# Patient Record
Sex: FEMALE | Race: WHITE | Employment: OTHER | URBAN - METROPOLITAN AREA
[De-identification: names, ages, dates, MRNs, and addresses within clinical notes are randomized per-mention and may not be internally consistent; named-entity substitution may affect disease eponyms.]

---

## 2017-03-28 ENCOUNTER — HOSPITAL ENCOUNTER (OUTPATIENT)
Dept: PREADMISSION TESTING | Age: 77
Discharge: HOME | End: 2017-03-28
Payer: MEDICARE

## 2017-03-28 LAB
ALBUMIN SERPL BCP-MCNC: 3.8 G/DL (ref 3.4–5)
ALBUMIN/GLOB SERPL: 1 {RATIO} (ref 0.8–1.7)
ALP SERPL-CCNC: 104 U/L (ref 45–117)
ALT SERPL-CCNC: 18 U/L (ref 13–56)
ANION GAP BLD CALC-SCNC: 9 MMOL/L (ref 3–18)
AST SERPL W P-5'-P-CCNC: 18 U/L (ref 15–37)
BASOPHILS # BLD AUTO: 0.1 K/UL (ref 0–0.06)
BASOPHILS # BLD: 1 % (ref 0–2)
BILIRUB SERPL-MCNC: 0.6 MG/DL (ref 0.2–1)
BUN SERPL-MCNC: 25 MG/DL (ref 7–18)
BUN/CREAT SERPL: 24 (ref 12–20)
CALCIUM SERPL-MCNC: 9.4 MG/DL (ref 8.5–10.1)
CHLORIDE SERPL-SCNC: 102 MMOL/L (ref 100–108)
CO2 SERPL-SCNC: 28 MMOL/L (ref 21–32)
CREAT SERPL-MCNC: 1.06 MG/DL (ref 0.6–1.3)
DIFFERENTIAL METHOD BLD: ABNORMAL
EOSINOPHIL # BLD: 0.3 K/UL (ref 0–0.4)
EOSINOPHIL NFR BLD: 4 % (ref 0–5)
ERYTHROCYTE [DISTWIDTH] IN BLOOD BY AUTOMATED COUNT: 13.3 % (ref 11.6–14.5)
GLOBULIN SER CALC-MCNC: 3.8 G/DL (ref 2–4)
GLUCOSE SERPL-MCNC: 94 MG/DL (ref 74–99)
HCT VFR BLD AUTO: 41.3 % (ref 35–45)
HGB BLD-MCNC: 14.3 G/DL (ref 12–16)
LYMPHOCYTES # BLD AUTO: 30 % (ref 21–52)
LYMPHOCYTES # BLD: 2.4 K/UL (ref 0.9–3.6)
MCH RBC QN AUTO: 29.9 PG (ref 24–34)
MCHC RBC AUTO-ENTMCNC: 34.6 G/DL (ref 31–37)
MCV RBC AUTO: 86.4 FL (ref 74–97)
MONOCYTES # BLD: 0.4 K/UL (ref 0.05–1.2)
MONOCYTES NFR BLD AUTO: 5 % (ref 3–10)
NEUTS SEG # BLD: 4.9 K/UL (ref 1.8–8)
NEUTS SEG NFR BLD AUTO: 60 % (ref 40–73)
PLATELET # BLD AUTO: 173 K/UL (ref 135–420)
PMV BLD AUTO: 8.9 FL (ref 9.2–11.8)
POTASSIUM SERPL-SCNC: 4.3 MMOL/L (ref 3.5–5.5)
PROT SERPL-MCNC: 7.6 G/DL (ref 6.4–8.2)
RBC # BLD AUTO: 4.78 M/UL (ref 4.2–5.3)
SODIUM SERPL-SCNC: 139 MMOL/L (ref 136–145)
WBC # BLD AUTO: 8.1 K/UL (ref 4.6–13.2)

## 2017-03-28 PROCEDURE — 93005 ELECTROCARDIOGRAM TRACING: CPT

## 2017-03-28 PROCEDURE — 85025 COMPLETE CBC W/AUTO DIFF WBC: CPT | Performed by: OBSTETRICS & GYNECOLOGY

## 2017-03-28 PROCEDURE — 80053 COMPREHEN METABOLIC PANEL: CPT | Performed by: OBSTETRICS & GYNECOLOGY

## 2017-03-28 PROCEDURE — 36415 COLL VENOUS BLD VENIPUNCTURE: CPT | Performed by: OBSTETRICS & GYNECOLOGY

## 2017-03-29 LAB
ATRIAL RATE: 56 BPM
CALCULATED P AXIS, ECG09: 73 DEGREES
CALCULATED R AXIS, ECG10: 69 DEGREES
CALCULATED T AXIS, ECG11: 76 DEGREES
DIAGNOSIS, 93000: NORMAL
P-R INTERVAL, ECG05: 218 MS
Q-T INTERVAL, ECG07: 490 MS
QRS DURATION, ECG06: 86 MS
QTC CALCULATION (BEZET), ECG08: 472 MS
VENTRICULAR RATE, ECG03: 56 BPM

## 2017-03-31 ENCOUNTER — ANESTHESIA EVENT (OUTPATIENT)
Dept: SURGERY | Age: 77
End: 2017-03-31
Payer: MEDICARE

## 2017-03-31 NOTE — H&P
This 78year old female presents for postmenopausal bleeding:. History of Present Illness:  1.  postmenopausal bleeding: The symptoms began 3 months ago and generally lasts varies. The symptoms are reported as being moderate. The symptoms occur randomly. The location is vagina. The symptoms are described as odor. Aggravating factors include nothing. Relieving factors include nothing. She states the symptoms are acute and have worsened. patient presents for postmenopausal bleeding. states she had hysterectomy and BSO but has been having light bleeding and spotting for the last 2-3 months. states she is also having discharge with odor. Past Systemic History    Medical History (Detailed)  Disease Onset Date Comments   Aneurysm     Cardiovascular disease     Hypertension     left kidney removed     tonsillectomy     vascular surgeries (Abdominal)       Surgical History/Management (Detailed)  Management Date Comments   Appendectomy     Hysterectomy, total abdominal, BSO     Pelvic sling         PROBLEM LIST:  Problem Description Onset Date   Left lower quadrant pain 2014   Right lower quadrant pain 2014       Medication Reconciliation  Medications reconciled today. Allergies:  Ingredient Reaction Medication Name Comment   ERYTHROMYCIN BASE      CODEINE      AMLODIPINE BESYLATE  Norvasc    ACETAMINOPHEN  Percocet    NIFEDIPINE  Procardia    CIPROFLOXACIN      PENICILLINS      OXYCODONE HCL  Percocet    SULFA (SULFONAMIDE ANTIBIOTICS)          Family History  (Detailed)  Relationship Family Member Name  Age at Death Condition Onset Age Cause of Death   Father    Hypertension  N   Mother  Y  Myocardial infarction 44 Y   Mother    Hypertension  N   Paternal aunt    Cancer, gastric  N   Paternal uncle    Cancer, gastric  N     Social History:  (Detailed)  Preferred language is English.     Tobacco use status: Never smoked tobacco.  Smoking status: Never smoker. ALCOHOL  There is a history of alcohol use. CAFFEINE  The patient uses caffeine. Review of Systems  System Neg/Pos Details   Constitutional Negative Fever, night sweats, weight gain and weight loss. Respiratory Negative Dyspnea. Cardio Negative Chest pain and edema. GI Negative Abdominal pain and constipation.  Negative Dysuria, hematuria, urinary frequency and urinary incontinence. Endocrine Negative Cold intolerance and heat intolerance. Neuro Negative Headache. Psych Negative Anxiety and depression. Integumentary Negative Rash. MS Negative Back pain. Hema/Lymph Negative Easy bleeding and easy bruising. Reproductive Positive Vaginal discharge. Reproductive Negative Dysmenorrhea, dyspareunia, hot flashes and irregular menses. Vital Signs     Height  Time ft in cm Last Measured Height Position   2:51 PM 5.0 1.00 154.94 09/25/2014 0     Weight/BSA/BMI  Time lb oz kg Context BMI kg/m2 BSA m2   2:51 .00  49.895  20.78      Blood Pressure  Time BP mm/Hg Position Side Site Method Cuff Size   2:51 /84 sitting left  manual adult     Temperature/Pulse/Respiration  Time Temp F Temp C Temp Site Pulse/min Pattern Resp/ min   2:51 PM    76 regular 12     Measured By  Time Measured by   2:51 PM Glenny Eagle       Physical Exam  Exam Findings Details   Constitutional Normal No acute distress. Well nourished. Well developed. Respiratory Normal Inspection - Normal. Auscultation - Normal. Effort - Normal.   Cardiovascular Normal Heart rate - Regular rate. Rhythm - Regular. Extremities - No edema. Abdomen Normal Anterior palpation -  No rebound. No abdominal tenderness. No hernia. Genitourinary * Vagina - foreign body present, left wall mucosal lesion. Cervix - surgically absent. Uterus - surgically absent.    Genitourinary Normal Urethral meatus - Normal. External genitalia - Normal. Glands - Normal. Perineum - Normal. Anus - Normal. Adnexa - Normal. Bladder - Normal. No suprapubic tenderness. No CVA tenderness. No vaginal discharge. Skin * Body areas examined - Abdomen, Genitalia, Groin, Buttocks. Skin Normal Inspection - Normal. Palpation/texture - Normal. Nails - Normal.   Spine * Inspection - no abnormality. Extremity Normal No Cyanosis. No Calf tenderness. No edema. No Ulceration. No Varicosities. Clubbing - Absent. Neurological Normal Level of consciousness - Normal. Orientation - Normal. Memory - Normal. Cranial nerves - Cranial nerves II through XII grossly intact. Sensory - Normal. Motor - Normal. Balance & gait - Normal. Coordination - Normal. Fine motor skills - Normal.   Psychiatric Normal Oriented to time, place, person and situation. Appropriate mood and affect. Assessment/Plan  # Detail Type Description    1. Assessment Foreign body in vagina, initial encounter (T19.2XXA). Patient Plan Will plan for removal under anesthesia. Appears to be suture type material. Plan for polyp removal at that time. Discussed risks benefits alternatives of surgery and patient wants to proceed. 2. Assessment Polyp of vagina (N84.2).

## 2017-04-03 ENCOUNTER — SURGERY (OUTPATIENT)
Age: 77
End: 2017-04-03

## 2017-04-03 ENCOUNTER — ANESTHESIA (OUTPATIENT)
Dept: SURGERY | Age: 77
End: 2017-04-03
Payer: MEDICARE

## 2017-04-03 ENCOUNTER — HOSPITAL ENCOUNTER (OUTPATIENT)
Age: 77
Setting detail: OUTPATIENT SURGERY
Discharge: HOME | End: 2017-04-03
Attending: OBSTETRICS & GYNECOLOGY | Admitting: OBSTETRICS & GYNECOLOGY
Payer: MEDICARE

## 2017-04-03 VITALS
WEIGHT: 113.1 LBS | RESPIRATION RATE: 16 BRPM | BODY MASS INDEX: 21.35 KG/M2 | SYSTOLIC BLOOD PRESSURE: 112 MMHG | HEIGHT: 61 IN | DIASTOLIC BLOOD PRESSURE: 63 MMHG | TEMPERATURE: 97.9 F | OXYGEN SATURATION: 97 % | HEART RATE: 74 BPM

## 2017-04-03 PROCEDURE — 77030018823 HC SLV COMPR VENO -B: Performed by: OBSTETRICS & GYNECOLOGY

## 2017-04-03 PROCEDURE — 77030020782 HC GWN BAIR PAWS FLX 3M -B: Performed by: OBSTETRICS & GYNECOLOGY

## 2017-04-03 PROCEDURE — 74011250636 HC RX REV CODE- 250/636: Performed by: OBSTETRICS & GYNECOLOGY

## 2017-04-03 PROCEDURE — 77030031139 HC SUT VCRL2 J&J -A: Performed by: OBSTETRICS & GYNECOLOGY

## 2017-04-03 PROCEDURE — 77030031140 HC SUT VCRL3 J&J -A: Performed by: OBSTETRICS & GYNECOLOGY

## 2017-04-03 PROCEDURE — 77030008477 HC STYL SATN SLP COVD -A: Performed by: SPECIALIST

## 2017-04-03 PROCEDURE — 76210000021 HC REC RM PH II 0.5 TO 1 HR: Performed by: OBSTETRICS & GYNECOLOGY

## 2017-04-03 PROCEDURE — 76060000032 HC ANESTHESIA 0.5 TO 1 HR: Performed by: OBSTETRICS & GYNECOLOGY

## 2017-04-03 PROCEDURE — 76010000138 HC OR TIME 0.5 TO 1 HR: Performed by: OBSTETRICS & GYNECOLOGY

## 2017-04-03 PROCEDURE — 74011000258 HC RX REV CODE- 258: Performed by: OBSTETRICS & GYNECOLOGY

## 2017-04-03 PROCEDURE — 74011250636 HC RX REV CODE- 250/636

## 2017-04-03 PROCEDURE — 74011000250 HC RX REV CODE- 250: Performed by: OBSTETRICS & GYNECOLOGY

## 2017-04-03 PROCEDURE — 77030027138 HC INCENT SPIROMETER -A: Performed by: OBSTETRICS & GYNECOLOGY

## 2017-04-03 PROCEDURE — 74011000250 HC RX REV CODE- 250

## 2017-04-03 PROCEDURE — 88300 SURGICAL PATH GROSS: CPT | Performed by: OBSTETRICS & GYNECOLOGY

## 2017-04-03 PROCEDURE — 88305 TISSUE EXAM BY PATHOLOGIST: CPT | Performed by: OBSTETRICS & GYNECOLOGY

## 2017-04-03 PROCEDURE — 76210000063 HC OR PH I REC FIRST 0.5 HR: Performed by: OBSTETRICS & GYNECOLOGY

## 2017-04-03 RX ORDER — SODIUM CHLORIDE 0.9 % (FLUSH) 0.9 %
5-10 SYRINGE (ML) INJECTION AS NEEDED
Status: DISCONTINUED | OUTPATIENT
Start: 2017-04-03 | End: 2017-04-03 | Stop reason: HOSPADM

## 2017-04-03 RX ORDER — BUPIVACAINE HYDROCHLORIDE 2.5 MG/ML
INJECTION, SOLUTION EPIDURAL; INFILTRATION; INTRACAUDAL AS NEEDED
Status: DISCONTINUED | OUTPATIENT
Start: 2017-04-03 | End: 2017-04-03 | Stop reason: HOSPADM

## 2017-04-03 RX ORDER — DEXAMETHASONE SODIUM PHOSPHATE 4 MG/ML
INJECTION, SOLUTION INTRA-ARTICULAR; INTRALESIONAL; INTRAMUSCULAR; INTRAVENOUS; SOFT TISSUE AS NEEDED
Status: DISCONTINUED | OUTPATIENT
Start: 2017-04-03 | End: 2017-04-03 | Stop reason: HOSPADM

## 2017-04-03 RX ORDER — HYDROMORPHONE HYDROCHLORIDE 2 MG/ML
0.5 INJECTION, SOLUTION INTRAMUSCULAR; INTRAVENOUS; SUBCUTANEOUS
Status: CANCELLED | OUTPATIENT
Start: 2017-04-03

## 2017-04-03 RX ORDER — ROCURONIUM BROMIDE 10 MG/ML
INJECTION, SOLUTION INTRAVENOUS AS NEEDED
Status: DISCONTINUED | OUTPATIENT
Start: 2017-04-03 | End: 2017-04-03 | Stop reason: HOSPADM

## 2017-04-03 RX ORDER — EPHEDRINE SULFATE/0.9% NACL/PF 25 MG/5 ML
SYRINGE (ML) INTRAVENOUS AS NEEDED
Status: DISCONTINUED | OUTPATIENT
Start: 2017-04-03 | End: 2017-04-03 | Stop reason: HOSPADM

## 2017-04-03 RX ORDER — ONDANSETRON 2 MG/ML
4 INJECTION INTRAMUSCULAR; INTRAVENOUS ONCE
Status: DISCONTINUED | OUTPATIENT
Start: 2017-04-03 | End: 2017-04-03 | Stop reason: HOSPADM

## 2017-04-03 RX ORDER — PROPOFOL 10 MG/ML
INJECTION, EMULSION INTRAVENOUS AS NEEDED
Status: DISCONTINUED | OUTPATIENT
Start: 2017-04-03 | End: 2017-04-03 | Stop reason: HOSPADM

## 2017-04-03 RX ORDER — ONDANSETRON 2 MG/ML
INJECTION INTRAMUSCULAR; INTRAVENOUS AS NEEDED
Status: DISCONTINUED | OUTPATIENT
Start: 2017-04-03 | End: 2017-04-03 | Stop reason: HOSPADM

## 2017-04-03 RX ORDER — MIDAZOLAM HYDROCHLORIDE 1 MG/ML
INJECTION, SOLUTION INTRAMUSCULAR; INTRAVENOUS AS NEEDED
Status: DISCONTINUED | OUTPATIENT
Start: 2017-04-03 | End: 2017-04-03 | Stop reason: HOSPADM

## 2017-04-03 RX ORDER — FENTANYL CITRATE 50 UG/ML
INJECTION, SOLUTION INTRAMUSCULAR; INTRAVENOUS AS NEEDED
Status: DISCONTINUED | OUTPATIENT
Start: 2017-04-03 | End: 2017-04-03 | Stop reason: HOSPADM

## 2017-04-03 RX ORDER — FENTANYL CITRATE 50 UG/ML
25 INJECTION, SOLUTION INTRAMUSCULAR; INTRAVENOUS AS NEEDED
Status: DISCONTINUED | OUTPATIENT
Start: 2017-04-03 | End: 2017-04-03 | Stop reason: HOSPADM

## 2017-04-03 RX ORDER — MAGNESIUM SULFATE 100 %
4 CRYSTALS MISCELLANEOUS AS NEEDED
Status: DISCONTINUED | OUTPATIENT
Start: 2017-04-03 | End: 2017-04-03 | Stop reason: HOSPADM

## 2017-04-03 RX ORDER — LIDOCAINE HYDROCHLORIDE 20 MG/ML
INJECTION, SOLUTION EPIDURAL; INFILTRATION; INTRACAUDAL; PERINEURAL AS NEEDED
Status: DISCONTINUED | OUTPATIENT
Start: 2017-04-03 | End: 2017-04-03 | Stop reason: HOSPADM

## 2017-04-03 RX ORDER — NALOXONE HYDROCHLORIDE 0.4 MG/ML
0.1 INJECTION, SOLUTION INTRAMUSCULAR; INTRAVENOUS; SUBCUTANEOUS AS NEEDED
Status: DISCONTINUED | OUTPATIENT
Start: 2017-04-03 | End: 2017-04-03 | Stop reason: HOSPADM

## 2017-04-03 RX ORDER — SODIUM CHLORIDE, SODIUM LACTATE, POTASSIUM CHLORIDE, CALCIUM CHLORIDE 600; 310; 30; 20 MG/100ML; MG/100ML; MG/100ML; MG/100ML
125 INJECTION, SOLUTION INTRAVENOUS CONTINUOUS
Status: DISCONTINUED | OUTPATIENT
Start: 2017-04-03 | End: 2017-04-03 | Stop reason: HOSPADM

## 2017-04-03 RX ORDER — ACETAMINOPHEN 10 MG/ML
15 INJECTION, SOLUTION INTRAVENOUS
Status: DISCONTINUED | OUTPATIENT
Start: 2017-04-03 | End: 2017-04-03 | Stop reason: HOSPADM

## 2017-04-03 RX ORDER — DEXTROSE 50 % IN WATER (D50W) INTRAVENOUS SYRINGE
25-50 AS NEEDED
Status: DISCONTINUED | OUTPATIENT
Start: 2017-04-03 | End: 2017-04-03 | Stop reason: HOSPADM

## 2017-04-03 RX ORDER — IBUPROFEN 800 MG/1
800 TABLET ORAL
Qty: 60 TAB | Refills: 6 | Status: SHIPPED | OUTPATIENT
Start: 2017-04-03

## 2017-04-03 RX ORDER — SODIUM CHLORIDE 9 MG/ML
125 INJECTION, SOLUTION INTRAVENOUS CONTINUOUS
Status: DISCONTINUED | OUTPATIENT
Start: 2017-04-03 | End: 2017-04-03 | Stop reason: HOSPADM

## 2017-04-03 RX ORDER — SUCCINYLCHOLINE CHLORIDE 20 MG/ML
INJECTION INTRAMUSCULAR; INTRAVENOUS AS NEEDED
Status: DISCONTINUED | OUTPATIENT
Start: 2017-04-03 | End: 2017-04-03 | Stop reason: HOSPADM

## 2017-04-03 RX ORDER — GLYCOPYRROLATE 0.2 MG/ML
INJECTION INTRAMUSCULAR; INTRAVENOUS AS NEEDED
Status: DISCONTINUED | OUTPATIENT
Start: 2017-04-03 | End: 2017-04-03 | Stop reason: HOSPADM

## 2017-04-03 RX ADMIN — Medication 10 MG: at 09:14

## 2017-04-03 RX ADMIN — SODIUM CHLORIDE, SODIUM LACTATE, POTASSIUM CHLORIDE, AND CALCIUM CHLORIDE 125 ML/HR: 600; 310; 30; 20 INJECTION, SOLUTION INTRAVENOUS at 09:53

## 2017-04-03 RX ADMIN — ONDANSETRON 4 MG: 2 INJECTION INTRAMUSCULAR; INTRAVENOUS at 08:51

## 2017-04-03 RX ADMIN — SODIUM CHLORIDE 600 MG: 900 INJECTION, SOLUTION INTRAVENOUS at 09:03

## 2017-04-03 RX ADMIN — DEXAMETHASONE SODIUM PHOSPHATE 4 MG: 4 INJECTION, SOLUTION INTRA-ARTICULAR; INTRALESIONAL; INTRAMUSCULAR; INTRAVENOUS; SOFT TISSUE at 09:03

## 2017-04-03 RX ADMIN — FENTANYL CITRATE 50 MCG: 50 INJECTION, SOLUTION INTRAMUSCULAR; INTRAVENOUS at 08:59

## 2017-04-03 RX ADMIN — GLYCOPYRROLATE 0.1 MG: 0.2 INJECTION INTRAMUSCULAR; INTRAVENOUS at 09:20

## 2017-04-03 RX ADMIN — BUPIVACAINE HYDROCHLORIDE 9 ML: 2.5 INJECTION, SOLUTION EPIDURAL; INFILTRATION; INTRACAUDAL; PERINEURAL at 09:20

## 2017-04-03 RX ADMIN — LIDOCAINE HYDROCHLORIDE 100 MG: 20 INJECTION, SOLUTION EPIDURAL; INFILTRATION; INTRACAUDAL; PERINEURAL at 08:56

## 2017-04-03 RX ADMIN — SUCCINYLCHOLINE CHLORIDE 80 MG: 20 INJECTION INTRAMUSCULAR; INTRAVENOUS at 08:56

## 2017-04-03 RX ADMIN — ROCURONIUM BROMIDE 2.5 MG: 10 INJECTION, SOLUTION INTRAVENOUS at 08:53

## 2017-04-03 RX ADMIN — Medication 10 MG: at 09:20

## 2017-04-03 RX ADMIN — PROPOFOL 150 MG: 10 INJECTION, EMULSION INTRAVENOUS at 08:56

## 2017-04-03 RX ADMIN — FENTANYL CITRATE 50 MCG: 50 INJECTION, SOLUTION INTRAMUSCULAR; INTRAVENOUS at 09:09

## 2017-04-03 RX ADMIN — MIDAZOLAM HYDROCHLORIDE 2 MG: 1 INJECTION, SOLUTION INTRAMUSCULAR; INTRAVENOUS at 08:51

## 2017-04-03 RX ADMIN — SODIUM CHLORIDE, SODIUM LACTATE, POTASSIUM CHLORIDE, AND CALCIUM CHLORIDE 125 ML/HR: 600; 310; 30; 20 INJECTION, SOLUTION INTRAVENOUS at 08:19

## 2017-04-03 NOTE — DISCHARGE INSTRUCTIONS
Take prescription as directed  Shower tomorrow - no tub baths, hot tubs or swimming pools until advised  Sanitary pads only - no tampons, douching or intercourse  Follow up with Dr. Yasmine Sanchez in 1 month  Contact Dr. Izzy Yarbrough office with any Post op questions or concerns [facility] [address] [facility] [facility]    The following personal items are in your possession at time of discharge:    Dental Appliances:  (with Eleda Danes)        Home Medications: None  Jewelry: None  Clothing: Undergarments, Footwear, Socks, Shirt, Pants (in locker #8)  Other Valuables: None             PATIENT INSTRUCTIONS:    After general anesthesia or intravenous sedation, for 24 hours or while taking prescription Narcotics:  · Limit your activities  · Do not drive and operate hazardous machinery  · Do not make important personal or business decisions  · Do  not drink alcoholic beverages  · If you have not urinated within 8 hours after discharge, please contact your surgeon on call. Report the following to your surgeon:  · Excessive pain, swelling, redness or odor of or around the surgical area  · Temperature over 100.5  · Nausea and vomiting lasting longer than 4 hours or if unable to take medications  · Any signs of decreased circulation or nerve impairment to extremity: change in color, persistent  numbness, tingling, coldness or increase pain  · Any questions        What to do at Home:  Recommended activity: Ambulate in house, No sex until advised  and No heavy lifting until advised     If you experience any of the following symptoms fever, chills, uncontrollable pain, active bleeding, nausea, vomiting, please follow up with Dr. Yasmine Sanchez. *  Please give a list of your current medications to your Primary Care Provider. *  Please update this list whenever your medications are discontinued, doses are      changed, or new medications (including over-the-counter products) are added.     *  Please carry medication information at all times in case of emergency situations. These are general instructions for a healthy lifestyle:    No smoking/ No tobacco products/ Avoid exposure to second hand smoke    Surgeon General's Warning:  Quitting smoking now greatly reduces serious risk to your health. Obesity, smoking, and sedentary lifestyle greatly increases your risk for illness    A healthy diet, regular physical exercise & weight monitoring are important for maintaining a healthy lifestyle    You may be retaining fluid if you have a history of heart failure or if you experience any of the following symptoms:  Weight gain of 3 pounds or more overnight or 5 pounds in a week, increased swelling in our hands or feet or shortness of breath while lying flat in bed. Please call your doctor as soon as you notice any of these symptoms; do not wait until your next office visit. Recognize signs and symptoms of STROKE:    F-face looks uneven    A-arms unable to move or move unevenly    S-speech slurred or non-existent    T-time-call 911 as soon as signs and symptoms begin-DO NOT go       Back to bed or wait to see if you get better-TIME IS BRAIN. Warning Signs of HEART ATTACK     Call 911 if you have these symptoms:   Chest discomfort. Most heart attacks involve discomfort in the center of the chest that lasts more than a few minutes, or that goes away and comes back. It can feel like uncomfortable pressure, squeezing, fullness, or pain.  Discomfort in other areas of the upper body. Symptoms can include pain or discomfort in one or both arms, the back, neck, jaw, or stomach.  Shortness of breath with or without chest discomfort.  Other signs may include breaking out in a cold sweat, nausea, or lightheadedness. Don't wait more than five minutes to call 911 - MINUTES MATTER! Fast action can save your life. Calling 911 is almost always the fastest way to get lifesaving treatment.  Emergency Medical Services staff can begin treatment when they arrive -- up to an hour sooner than if someone gets to the hospital by car. Patient armband removed and shredded    The discharge information has been reviewed with the patient and caregiver. The patient and caregiver verbalized understanding. Discharge medications reviewed with the patient and caregiver and appropriate educational materials and side effects teaching were provided.

## 2017-04-03 NOTE — ANESTHESIA PREPROCEDURE EVALUATION
Anesthetic History   No history of anesthetic complications     Pertinent negatives: No PONV       Review of Systems / Medical History  Patient summary reviewed, nursing notes reviewed and pertinent labs reviewed    Pulmonary    COPD: moderate        Asthma : well controlled       Neuro/Psych   Within defined limits           Cardiovascular    Hypertension          CAD  Pertinent negatives: No past MI       GI/Hepatic/Renal  Within defined limits           Pertinent negatives: No GERD, liver disease and renal disease (single kidney)   Endo/Other        Arthritis  Pertinent negatives: No diabetes   Other Findings              Physical Exam    Airway  Mallampati: I  TM Distance: 4 - 6 cm  Neck ROM: normal range of motion   Mouth opening: Normal     Cardiovascular    Rhythm: regular  Rate: normal         Dental    Dentition: Edentulous     Pulmonary  Breath sounds clear to auscultation               Abdominal         Other Findings            Anesthetic Plan    ASA: 2  Anesthesia type: general          Induction: Intravenous and RSI  Anesthetic plan and risks discussed with: Patient      Discussed GA now or later vs spinal

## 2017-04-03 NOTE — PERIOP NOTES
Call to family member Michael Gama, no answer, to voice mail, brief general message left, no identifiers

## 2017-04-03 NOTE — ANESTHESIA POSTPROCEDURE EVALUATION
Post-Anesthesia Evaluation and Assessment    Cardiovascular Function/Vital Signs  Visit Vitals    /69    Pulse 74    Temp 36.8 °C (98.3 °F)    Resp 18    Ht 5' 1\" (1.549 m)    Wt 51.3 kg (113 lb 1.6 oz)    SpO2 100%    BMI 21.37 kg/m2       Patient is status post Procedure(s):  REMOVAL VAGINAL POLYP& FOREIGN BODY                                                                                                                                              . Nausea/Vomiting: Controlled. Postoperative hydration reviewed and adequate. Pain:  Pain Scale 1: Numeric (0 - 10) (04/03/17 1007)  Pain Intensity 1: 0 (04/03/17 1007)   Managed. Neurological Status:   Neuro (WDL): Within Defined Limits (04/03/17 0802)   At baseline. Mental Status and Level of Consciousness: Arousable. Pulmonary Status:   O2 Device: Room air (04/03/17 1007)   Adequate oxygenation and airway patent. Complications related to anesthesia: None    Post-anesthesia assessment completed. No concerns. Patient has met all discharge requirements.     Signed By: Adia Banerjee MD    April 3, 2017

## 2017-04-03 NOTE — PERIOP NOTES
Received patient from, OR nurse & anesthesia provide, with patient identification completed, review of procedure, and intraoperative course.

## 2017-04-03 NOTE — INTERVAL H&P NOTE
H&P Update:  Michelle Cutler was seen and examined. History and physical has been reviewed. The patient has been examined.  There have been no significant clinical changes since the completion of the originally dated History and Physical.    Signed By: Schuyler Palafox MD     April 3, 2017 8:42 AM

## 2017-04-03 NOTE — IP AVS SNAPSHOT
[address] [place] [address] 
[phone] Patient: Donato Jane MRN: [id number] You are allergic to the following Allergen Reactions Dilaudid (Hydromorphone) Anaphylaxis Ciprofloxacin Rash Codeine Nausea and Vomiting Erythromycin Rash Norvasc (Amlodipine) Other (comments) \"I almost passed out\" Penicillins Rash Percocet (Oxycodone-Acetaminophen) Unknown (comments) Does take tylenol Procardia (Nifedipine) Other (comments) \"I almost passed out\" Sulfa (Sulfonamide Antibiotics) Rash Recent Documentation Height Weight BMI OB Status Smoking Status 1.549 m 51.3 kg 21.37 kg/m2 Hysterectomy Former Smoker Emergency Contacts Name Discharge Info Relation Home Work Mobile 58 Rhiannon Smith CAREGIVER [3] Friend [5]   [phone] Jonel Calvillo  Daughter [21] [phone] About your hospitalization You were admitted on:  April 3, 2017 [facility] You were discharged on:  April 3, 2017 Unit phone number:  [phone] Why you were hospitalized Your primary diagnosis was:  Not on File Providers Seen During Your Hospitalizations Provider Role Specialty Primary office phone Mauricio Garner MD Attending Provider Obstetrics & Gynecology [phone] Your Primary Care Physician (PCP) Primary Care Physician Office Phone Office Fax Calhoun Vishal [phone] Follow-up Information Follow up With Details Comments Contact Info Huong Waters MD   [facility] 
[address] [place] [address] 
[phone] Mauricio Garner MD Follow up in 1 month(s)  [address] [facility] [address] [place] [address] 
[phone] Current Discharge Medication List  
  
 CONTINUE these medications which have CHANGED Dose & Instructions Dispensing Information Comments Morning Noon Evening Bedtime * MOTRIN  mg tablet Generic drug:  ibuprofen What changed:  Another medication with the same name was added. Make sure you understand how and when to take each. Your last dose was: Your next dose is:    
   
   
 Dose:  800 mg Take 800 mg by mouth every eight (8) hours as needed for Pain. Indications: Pain Refills:  0  
     
   
   
   
  
 * ibuprofen 800 mg tablet Commonly known as:  MOTRIN What changed: You were already taking a medication with the same name, and this prescription was added. Make sure you understand how and when to take each. Your last dose was: Your next dose is:    
   
   
 Dose:  800 mg Take 1 Tab by mouth every eight (8) hours as needed for Pain. Quantity:  60 Tab Refills:  6  
     
   
   
   
  
 * Notice: This list has 2 medication(s) that are the same as other medications prescribed for you. Read the directions carefully, and ask your doctor or other care provider to review them with you. CONTINUE these medications which have NOT CHANGED Dose & Instructions Dispensing Information Comments Morning Noon Evening Bedtime ANORO ELLIPTA 62.5-25 mcg/actuation inhaler Generic drug:  umeclidinium-vilanterol Your last dose was: Your next dose is:    
   
   
 Dose:  1 Puff Take 1 Puff by inhalation two (2) times a day. Indications: BRONCHOSPASM PREVENTION WITH COPD Refills:  0  
     
   
   
   
  
 aspirin 81 mg chewable tablet Your last dose was: Your next dose is:    
   
   
 Dose:  81 mg Take 81 mg by mouth daily. Refills:  0 HYDRALAZINE Your last dose was: Your next dose is:    
   
   
 Dose:  25 mg Take 25 mg by mouth three (3) times daily. Refills:  0  LIPITOR PO  
   
 Your last dose was: Your next dose is:    
   
   
 Dose:  40 mg Take 40 mg by mouth nightly. Refills:  0  
     
   
   
   
  
 TOPROL XL PO Your last dose was: Your next dose is:    
   
   
 Dose:  50 mg Take 50 mg by mouth two (2) times a day. Refills:  0  
     
   
   
   
  
 VASOTEC PO Your last dose was: Your next dose is:    
   
   
 Dose:  20 mg Take 20 mg by mouth two (2) times a day. Refills:  0 Where to Get Your Medications Information on where to get these meds will be given to you by the nurse or doctor. ! Ask your nurse or doctor about these medications  
  ibuprofen 800 mg tablet Discharge Instructions Take prescription as directed Shower tomorrow - no tub baths, hot tubs or swimming pools until advised Sanitary pads only - no tampons, douching or intercourse Follow up with Dr. Izaiah King in 1 month Contact Dr. Andres Sharif office with any Post op questions or concerns at [phone] DISCHARGE SUMMARY from Nurse The following personal items are in your possession at time of discharge: 
 
Dental Appliances:  (with Chaparrita Brito) Home Medications: None Jewelry: None Clothing: Undergarments, Footwear, Socks, Shirt, Pants (in locker #8) Other Valuables: None PATIENT INSTRUCTIONS: 
 
 
F-face looks uneven A-arms unable to move or move unevenly S-speech slurred or non-existent T-time-call 911 as soon as signs and symptoms begin-DO NOT go Back to bed or wait to see if you get better-TIME IS BRAIN.  
 
Warning Signs of HEART ATTACK  
 
 Call 911 if you have these symptoms: 
? Chest discomfort. Most heart attacks involve discomfort in the center of the chest that lasts more than a few minutes, or that goes away and comes back. It can feel like uncomfortable pressure, squeezing, fullness, or pain. ? Discomfort in other areas of the upper body. Symptoms can include pain or discomfort in one or both arms, the back, neck, jaw, or stomach. ? Shortness of breath with or without chest discomfort. ? Other signs may include breaking out in a cold sweat, nausea, or lightheadedness. Don't wait more than five minutes to call [address]! Fast action can save your life. Calling 911 is almost always the fastest way to get lifesaving treatment. Emergency Medical Services staff can begin treatment when they arrive  up to an hour sooner than if someone gets to the hospital by car. Patient armband removed and shredded The discharge information has been reviewed with the patient and caregiver. The patient and caregiver verbalized understanding. Discharge medications reviewed with the patient and caregiver and appropriate educational materials and side effects teaching were provided. Discharge Orders None Introducing [facility]! Aranza De Leon introduces Smartsheet patient portal. Now you can access parts of your medical record, email your doctor's office, and request medication refills online. 1. In your internet browser, go to [email or web address] 2. Click on the First Time User? Click Here link in the Sign In box. You will see the New Member Sign Up page. 3. Enter your Smartsheet Access Code exactly as it appears below. You will not need to use this code after youve completed the sign-up process. If you do not sign up before the expiration date, you must request a new code. · Smartsheet Access Code: [address] Expires: 6/20/2017  6:12 PM 
 
 4. Enter the last four digits of your Social Security Number (xxxx) and Date of Birth (mm/dd/yyyy) as indicated and click Submit. You will be taken to the next sign-up page. 5. Create a [employer] ID. This will be your [employer] login ID and cannot be changed, so think of one that is secure and easy to remember. 6. Create a [employer] password. You can change your password at any time. 7. Enter your Password Reset Question and Answer. This can be used at a later time if you forget your password. 8. Enter your e-mail address. You will receive e-mail notification when new information is available in [address] 9. Click Sign Up. You can now view and download portions of your medical record. 10. Click the Download Summary menu link to download a portable copy of your medical information. If you have questions, please visit the Frequently Asked Questions section of the [employer] website. Remember, [employer] is NOT to be used for urgent needs. For medical emergencies, dial 911. Now available from your iPhone and Android! General Information Please provide this summary of care documentation to your next provider. Patient Signature:  ____________________________________________________________ Date:  ____________________________________________________________  
  
Abiodun Lesly Provider Signature:  ____________________________________________________________ Date:  ____________________________________________________________

## 2017-04-03 NOTE — H&P (VIEW-ONLY)
This 78year old female presents for postmenopausal bleeding:. History of Present Illness:  1.  postmenopausal bleeding: The symptoms began 3 months ago and generally lasts varies. The symptoms are reported as being moderate. The symptoms occur randomly. The location is vagina. The symptoms are described as odor. Aggravating factors include nothing. Relieving factors include nothing. She states the symptoms are acute and have worsened. patient presents for postmenopausal bleeding. states she had hysterectomy and BSO but has been having light bleeding and spotting for the last 2-3 months. states she is also having discharge with odor. Past Systemic History    Medical History (Detailed)  Disease Onset Date Comments   Aneurysm     Cardiovascular disease     Hypertension     left kidney removed     tonsillectomy     vascular surgeries (Abdominal)       Surgical History/Management (Detailed)  Management Date Comments   Appendectomy     Hysterectomy, total abdominal, BSO     Pelvic sling         PROBLEM LIST:  Problem Description Onset Date   Left lower quadrant pain 2014   Right lower quadrant pain 2014       Medication Reconciliation  Medications reconciled today. Allergies:  Ingredient Reaction Medication Name Comment   ERYTHROMYCIN BASE      CODEINE      AMLODIPINE BESYLATE  Norvasc    ACETAMINOPHEN  Percocet    NIFEDIPINE  Procardia    CIPROFLOXACIN      PENICILLINS      OXYCODONE HCL  Percocet    SULFA (SULFONAMIDE ANTIBIOTICS)          Family History  (Detailed)  Relationship Family Member Name  Age at Death Condition Onset Age Cause of Death   Father    Hypertension  N   Mother  Y  Myocardial infarction 44 Y   Mother    Hypertension  N   Paternal aunt    Cancer, gastric  N   Paternal uncle    Cancer, gastric  N     Social History:  (Detailed)  Preferred language is English.     Tobacco use status: Never smoked tobacco.  Smoking status: Never smoker. ALCOHOL  There is a history of alcohol use. CAFFEINE  The patient uses caffeine. Review of Systems  System Neg/Pos Details   Constitutional Negative Fever, night sweats, weight gain and weight loss. Respiratory Negative Dyspnea. Cardio Negative Chest pain and edema. GI Negative Abdominal pain and constipation.  Negative Dysuria, hematuria, urinary frequency and urinary incontinence. Endocrine Negative Cold intolerance and heat intolerance. Neuro Negative Headache. Psych Negative Anxiety and depression. Integumentary Negative Rash. MS Negative Back pain. Hema/Lymph Negative Easy bleeding and easy bruising. Reproductive Positive Vaginal discharge. Reproductive Negative Dysmenorrhea, dyspareunia, hot flashes and irregular menses. Vital Signs     Height  Time ft in cm Last Measured Height Position   2:51 PM 5.0 1.00 154.94 09/25/2014 0     Weight/BSA/BMI  Time lb oz kg Context BMI kg/m2 BSA m2   2:51 .00  49.895  20.78      Blood Pressure  Time BP mm/Hg Position Side Site Method Cuff Size   2:51 /84 sitting left  manual adult     Temperature/Pulse/Respiration  Time Temp F Temp C Temp Site Pulse/min Pattern Resp/ min   2:51 PM    76 regular 12     Measured By  Time Measured by   2:51 PM Olga Wallis       Physical Exam  Exam Findings Details   Constitutional Normal No acute distress. Well nourished. Well developed. Respiratory Normal Inspection - Normal. Auscultation - Normal. Effort - Normal.   Cardiovascular Normal Heart rate - Regular rate. Rhythm - Regular. Extremities - No edema. Abdomen Normal Anterior palpation -  No rebound. No abdominal tenderness. No hernia. Genitourinary * Vagina - foreign body present, left wall mucosal lesion. Cervix - surgically absent. Uterus - surgically absent.    Genitourinary Normal Urethral meatus - Normal. External genitalia - Normal. Glands - Normal. Perineum - Normal. Anus - Normal. Adnexa - Normal. Bladder - Normal. No suprapubic tenderness. No CVA tenderness. No vaginal discharge. Skin * Body areas examined - Abdomen, Genitalia, Groin, Buttocks. Skin Normal Inspection - Normal. Palpation/texture - Normal. Nails - Normal.   Spine * Inspection - no abnormality. Extremity Normal No Cyanosis. No Calf tenderness. No edema. No Ulceration. No Varicosities. Clubbing - Absent. Neurological Normal Level of consciousness - Normal. Orientation - Normal. Memory - Normal. Cranial nerves - Cranial nerves II through XII grossly intact. Sensory - Normal. Motor - Normal. Balance & gait - Normal. Coordination - Normal. Fine motor skills - Normal.   Psychiatric Normal Oriented to time, place, person and situation. Appropriate mood and affect. Assessment/Plan  # Detail Type Description    1. Assessment Foreign body in vagina, initial encounter (T19.2XXA). Patient Plan Will plan for removal under anesthesia. Appears to be suture type material. Plan for polyp removal at that time. Discussed risks benefits alternatives of surgery and patient wants to proceed. 2. Assessment Polyp of vagina (N84.2).

## 2017-04-03 NOTE — OP NOTES
Outpatient Operative Note     Surgeon(s): Faye Gr DO   Pre-operative Diagnosis: Foreign body vagina, Vaginal polyp  Post-operative Diagnosis: Foreign body vagina, Vaginal polyp  Procedure(s) Performed: Excision foreign body vagina, Excision vaginal polyp                                     Anesthesia: General with ETT  Findings: suture vaginal cuff, left vaginal wall polyp  Complications: none   Estimated Blood Loss: minimal  Specimens: vaginal polyp, foreign body  Procedure: The patient was taken to the operating room where she was placed in the supine position. After being placed under general anesthesia, she was placed up in the [facility] in the dorsal lithotomy position. The patient was then prepped and draped. A foss catheter was inserted. Attention was turned to the vagina where the speculum was placed in the vagina. The foreign body was identified. The suture material was dissected free and transected at the base of the vagina. The vaginal defect was closed with 3-0 Vicryl figure-of-eight. The left vaginal polyp was excised at the vaginal mucosa. Bovie cautery was used for hemostasis. All instruments were removed from the vagina. The patient was awakened, extubated and taken to the recovery room in good condition.

## 2017-04-03 NOTE — PERIOP NOTES
Dr Siva Olivo notified that patient had 3 sips of coffee with cream and sugar at 0630. No orders given.

## 2017-04-03 NOTE — PERIOP NOTES
[facility] med list reviewed and verified with GOLDIE Carr RN - duplicate for Ibuprofen noted - common med side effects handout to pt

## 2022-03-26 NOTE — PERIOP NOTES
End of Shift Note: Medical    What matters most to the patient this shift: no medications except morphine     Significant Events:none    Pain Management: Last Pain Score: Numeric Rating Scale 0-10: 2 (03/25/22 1345)                                      Pain medication:  Morphine.  Last given:  1252   Diet: Regular; Patient May Eat For Pleasure If Awake Diet  3 Times/day W Meals; Nepro With Carbsteady/renal Supplement Oral Nutrition Supplement      Bowel Function:  Normal  LBM:  3/24   Activity:  Activity: Resting in bed (03/25/22 1900)  Mobility Assistive Device:  Mobility Assistive Device: Walker;Gait belt;Friction reducing device/Slide sheet (03/25/22 1900)  Level of Assistance:  Level of Assistance: Moderate assist (03/25/22 1900)  Positioning: Positioning: Turned Q 2 hours;Semi-fowlers;Offloading/tilt right;Offloading/tilt left (03/25/22 1716)  LDAs: peripheral IV   Patient's Anticipated Discharge Needs: Other (comment) (inpatient hospice) (03/24/22 5356)  Expected Discharge Date: TBD  Expected Discharge Time:        Notified Luzmaria RAMIREZ in [facility] to notify Dr Prakash Lepe that patient has ASA 81 mg last on 4/2/17.

## (undated) DEVICE — SYR 10ML CTRL LR LCK NSAF LF --

## (undated) DEVICE — PACK,BASIC,IX: Brand: MEDLINE

## (undated) DEVICE — SUT VCRL + 2-0 27IN CT2 UD -- 36/BX

## (undated) DEVICE — MATERNITY PAD,HEAVY: Brand: CURITY

## (undated) DEVICE — DEVON™ KNEE AND BODY STRAP 60" X 3" (1.5 M X 7.6 CM): Brand: DEVON

## (undated) DEVICE — NEEDLE HYPO 22GA L1.5IN BLK S STL HUB POLYPR SHLD REG BVL

## (undated) DEVICE — BSHR MAJOR BASIN PACK-LF: Brand: MEDLINE INDUSTRIES, INC.

## (undated) DEVICE — STERILE LATEX POWDER-FREE SURGICAL GLOVESWITH NITRILE COATING: Brand: PROTEXIS

## (undated) DEVICE — GOWN,AURORA,NONRNF,XL,30/CS: Brand: MEDLINE

## (undated) DEVICE — Z INACTIVE USE 2527070 DRAPE SURG W40XL44IN UNDERBUTTOCK SMS POLYPR W/ PCH BK DISP

## (undated) DEVICE — Device

## (undated) DEVICE — PAD,NON-ADHERENT,2X3,STERILE,LF,1/PK: Brand: MEDLINE

## (undated) DEVICE — SUT VCRL + 3-0 27IN CT2 UD --

## (undated) DEVICE — PACK,LITHOTOMY,PK IV,AURORA: Brand: MEDLINE